# Patient Record
Sex: FEMALE
[De-identification: names, ages, dates, MRNs, and addresses within clinical notes are randomized per-mention and may not be internally consistent; named-entity substitution may affect disease eponyms.]

---

## 2024-09-16 PROBLEM — Z00.00 ENCOUNTER FOR PREVENTIVE HEALTH EXAMINATION: Status: ACTIVE | Noted: 2024-09-16

## 2024-09-17 ENCOUNTER — APPOINTMENT (OUTPATIENT)
Dept: ANTEPARTUM | Facility: CLINIC | Age: 39
End: 2024-09-17

## 2024-09-17 ENCOUNTER — APPOINTMENT (OUTPATIENT)
Dept: OBGYN | Facility: CLINIC | Age: 39
End: 2024-09-17
Payer: MEDICAID

## 2024-09-17 DIAGNOSIS — Z34.90 ENCOUNTER FOR SUPERVISION OF NORMAL PREGNANCY, UNSPECIFIED, UNSPECIFIED TRIMESTER: ICD-10-CM

## 2024-09-17 PROCEDURE — 76813 OB US NUCHAL MEAS 1 GEST: CPT

## 2024-09-17 PROCEDURE — 99203 OFFICE O/P NEW LOW 30 MIN: CPT | Mod: TH

## 2024-09-17 PROCEDURE — 76801 OB US < 14 WKS SINGLE FETUS: CPT | Mod: 59

## 2024-09-17 RX ORDER — ASCORBIC ACID, CHOLECALCIFEROL, .ALPHA.-TOCOPHEROL ACETATE, DL-, PYRIDOXINE, FOLIC ACID, CYANOCOBALAMIN, CALCIUM, FERROUS FUMARATE, MAGNESIUM, DOCONEXENT 85; 200; 10; 25; 1; 12; 140; 27; 45; 300 [IU]/1; [IU]/1; [IU]/1; [IU]/1; MG/1; UG/1; MG/1; MG/1; MG/1; MG/1
27-0.6-0.4-3 CAPSULE, GELATIN COATED ORAL
Qty: 90 | Refills: 3 | Status: ACTIVE | COMMUNITY
Start: 2024-09-17 | End: 1900-01-01

## 2024-09-23 LAB
ADDITIONAL US: NORMAL
APPEARANCE: ABNORMAL
BACTERIA UR CULT: NORMAL
BACTERIA: ABNORMAL /HPF
BILIRUBIN URINE: NEGATIVE
BLOOD URINE: NEGATIVE
CAST: 1 /LPF
COLOR: YELLOW
CRL SCAN TWIN B: NORMAL
CRL SCAN: NORMAL
CROWN RUMP LENGTH TWIN B: NORMAL
CROWN RUMP LENGTH: 55.5 MM
DIA MOM: 0.75
DIA VALUE: 155.6 PG/ML
DOWN SYNDROME AGE RISK: NORMAL
DOWN SYNDROME INTERPRETATION: NORMAL
DOWN SYNDROME SCREENING RISK: NORMAL
EPITHELIAL CELLS: 2 /HPF
FIRST TRIMESTER SCREEN COMMENTS: NORMAL
FIRST TRIMESTER SCREEN NOTE: NORMAL
FIRST TRIMESTER SCREEN RESULTS: NORMAL
FIRST TRIMESTER SCREEN TEST RESULTS: NORMAL
GEST. AGE ON COLLECTION DATE: 12 WEEKS
GLUCOSE QUALITATIVE U: NEGATIVE MG/DL
HCG MOM: 1.11
HCG VALUE: 90.8 IU/ML
KETONES URINE: ABNORMAL MG/DL
LEUKOCYTE ESTERASE URINE: NEGATIVE
MATERNAL AGE AT EDD: 38.1 YR
MICROSCOPIC-UA: NORMAL
NITRITE URINE: NEGATIVE
NT MOM TWIN B: NORMAL
NT TWIN B: NORMAL
NUCHAL TRANSLUCENCY (NT): 1.2 MM
NUCHAL TRANSLUCENCY MOM: 0.96
NUMBER OF FETUSES: 1
PAPP-A MOM: 0.61
PAPP-A VALUE: 320.5 NG/ML
PH URINE: 6
PROTEIN URINE: NORMAL MG/DL
RACE: NORMAL
RED BLOOD CELLS URINE: 2 /HPF
SONOGRAPHER ID#: NORMAL
SPECIFIC GRAVITY URINE: >1.03
TRISOMY 18 AGE RISK: NORMAL
TRISOMY 18 INTERPRETATION: NORMAL
TRISOMY 18 SCREENING RISK: NORMAL
UROBILINOGEN URINE: 0.2 MG/DL
WEIGHT AFP: 217 LBS
WHITE BLOOD CELLS URINE: 1 /HPF

## 2024-09-24 LAB
CHROMOSOME13 INTERPRETATION: NORMAL
CHROMOSOME13 TEST RESULT: NORMAL
CHROMOSOME18 INTERPRETATION: NORMAL
CHROMOSOME18 TEST RESULT: NORMAL
CHROMOSOME21 INTERPRETATION: NORMAL
CHROMOSOME21 TEST RESULT: NORMAL
FETAL FRACTION: NORMAL
PERFORMANCE AND LIMITATIONS: NORMAL
SEX CHROMOSOME INTERPRETATION: NORMAL
SEX CHROMOSOME TEST RESULT: NORMAL
VERIFI PRENATAL TEST: NOT DETECTED

## 2024-09-30 DIAGNOSIS — O09.90 SUPERVISION OF HIGH RISK PREGNANCY, UNSPECIFIED, UNSPECIFIED TRIMESTER: ICD-10-CM

## 2024-09-30 DIAGNOSIS — O09.299 SUPERVISION OF PREGNANCY WITH OTHER POOR REPRODUCTIVE OR OBSTETRIC HISTORY, UNSPECIFIED TRIMESTER: ICD-10-CM

## 2024-09-30 DIAGNOSIS — Z3A.14 14 WEEKS GESTATION OF PREGNANCY: ICD-10-CM

## 2024-10-01 ENCOUNTER — APPOINTMENT (OUTPATIENT)
Dept: ANTEPARTUM | Facility: CLINIC | Age: 39
End: 2024-10-01

## 2024-10-01 ENCOUNTER — APPOINTMENT (OUTPATIENT)
Dept: OBGYN | Facility: CLINIC | Age: 39
End: 2024-10-01

## 2024-12-11 ENCOUNTER — APPOINTMENT (OUTPATIENT)
Dept: OBGYN | Facility: CLINIC | Age: 39
End: 2024-12-11

## 2024-12-11 ENCOUNTER — APPOINTMENT (OUTPATIENT)
Dept: ANTEPARTUM | Facility: CLINIC | Age: 39
End: 2024-12-11

## 2025-01-09 ENCOUNTER — APPOINTMENT (OUTPATIENT)
Dept: ANTEPARTUM | Facility: CLINIC | Age: 40
End: 2025-01-09
Payer: MEDICAID

## 2025-01-09 ENCOUNTER — ASOB RESULT (OUTPATIENT)
Age: 40
End: 2025-01-09

## 2025-01-09 ENCOUNTER — APPOINTMENT (OUTPATIENT)
Dept: OBGYN | Facility: CLINIC | Age: 40
End: 2025-01-09
Payer: SELF-PAY

## 2025-01-09 VITALS — SYSTOLIC BLOOD PRESSURE: 117 MMHG | DIASTOLIC BLOOD PRESSURE: 78 MMHG | WEIGHT: 231 LBS

## 2025-01-09 DIAGNOSIS — Z23 ENCOUNTER FOR IMMUNIZATION: ICD-10-CM

## 2025-01-09 DIAGNOSIS — Z34.90 ENCOUNTER FOR SUPERVISION OF NORMAL PREGNANCY, UNSPECIFIED, UNSPECIFIED TRIMESTER: ICD-10-CM

## 2025-01-09 PROCEDURE — 99213 OFFICE O/P EST LOW 20 MIN: CPT | Mod: 25

## 2025-01-09 PROCEDURE — 90715 TDAP VACCINE 7 YRS/> IM: CPT

## 2025-01-09 PROCEDURE — 90471 IMMUNIZATION ADMIN: CPT

## 2025-01-09 PROCEDURE — 36415 COLL VENOUS BLD VENIPUNCTURE: CPT

## 2025-01-09 PROCEDURE — 76811 OB US DETAILED SNGL FETUS: CPT

## 2025-01-09 PROCEDURE — 76819 FETAL BIOPHYS PROFIL W/O NST: CPT

## 2025-01-12 LAB — GLUCOSE 1H P 50 G GLC PO SERPL-MCNC: 112 MG/DL

## 2025-01-13 DIAGNOSIS — O99.019 ANEMIA COMPLICATING PREGNANCY, UNSPECIFIED TRIMESTER: ICD-10-CM

## 2025-01-13 LAB
BASOPHILS # BLD AUTO: 0.03 K/UL
BASOPHILS NFR BLD AUTO: 0.4 %
EOSINOPHIL # BLD AUTO: 0.14 K/UL
EOSINOPHIL NFR BLD AUTO: 1.9 %
HCT VFR BLD CALC: 30.5 %
HGB BLD-MCNC: 10.4 G/DL
IMM GRANULOCYTES NFR BLD AUTO: 0.4 %
LYMPHOCYTES # BLD AUTO: 2.49 K/UL
LYMPHOCYTES NFR BLD AUTO: 34.6 %
MAN DIFF?: NORMAL
MCHC RBC-ENTMCNC: 30.1 PG
MCHC RBC-ENTMCNC: 34.1 G/DL
MCV RBC AUTO: 88.2 FL
MONOCYTES # BLD AUTO: 0.52 K/UL
MONOCYTES NFR BLD AUTO: 7.2 %
NEUTROPHILS # BLD AUTO: 3.98 K/UL
NEUTROPHILS NFR BLD AUTO: 55.5 %
PLATELET # BLD AUTO: 213 K/UL
RBC # BLD: 3.46 M/UL
RBC # FLD: 14 %
WBC # FLD AUTO: 7.19 K/UL

## 2025-01-13 RX ORDER — FERRIC MALTOL 30 MG/1
30 CAPSULE ORAL
Qty: 3 | Refills: 3 | Status: ACTIVE | COMMUNITY
Start: 2025-01-13 | End: 1900-01-01

## 2025-01-23 ENCOUNTER — APPOINTMENT (OUTPATIENT)
Dept: OBGYN | Facility: CLINIC | Age: 40
End: 2025-01-23
Payer: MEDICAID

## 2025-01-23 ENCOUNTER — NON-APPOINTMENT (OUTPATIENT)
Age: 40
End: 2025-01-23

## 2025-01-23 VITALS
SYSTOLIC BLOOD PRESSURE: 118 MMHG | DIASTOLIC BLOOD PRESSURE: 78 MMHG | BODY MASS INDEX: 36.26 KG/M2 | WEIGHT: 231 LBS | HEIGHT: 67 IN

## 2025-01-23 PROCEDURE — ZZZZZ: CPT

## 2025-02-06 ENCOUNTER — APPOINTMENT (OUTPATIENT)
Dept: OBGYN | Facility: CLINIC | Age: 40
End: 2025-02-06

## 2025-02-06 ENCOUNTER — APPOINTMENT (OUTPATIENT)
Dept: OBGYN | Facility: CLINIC | Age: 40
End: 2025-02-06
Payer: MEDICAID

## 2025-02-06 VITALS
SYSTOLIC BLOOD PRESSURE: 115 MMHG | HEIGHT: 67 IN | BODY MASS INDEX: 37.51 KG/M2 | WEIGHT: 239 LBS | DIASTOLIC BLOOD PRESSURE: 77 MMHG

## 2025-02-06 PROCEDURE — 99212 OFFICE O/P EST SF 10 MIN: CPT | Mod: TH

## 2025-02-20 ENCOUNTER — APPOINTMENT (OUTPATIENT)
Dept: ANTEPARTUM | Facility: CLINIC | Age: 40
End: 2025-02-20

## 2025-02-20 ENCOUNTER — APPOINTMENT (OUTPATIENT)
Dept: OBGYN | Facility: CLINIC | Age: 40
End: 2025-02-20

## 2025-02-27 ENCOUNTER — APPOINTMENT (OUTPATIENT)
Dept: OBGYN | Facility: CLINIC | Age: 40
End: 2025-02-27

## 2025-03-20 ENCOUNTER — APPOINTMENT (OUTPATIENT)
Dept: ANTEPARTUM | Facility: CLINIC | Age: 40
End: 2025-03-20

## 2025-03-20 ENCOUNTER — APPOINTMENT (OUTPATIENT)
Dept: OBGYN | Facility: CLINIC | Age: 40
End: 2025-03-20

## 2025-03-27 ENCOUNTER — ASOB RESULT (OUTPATIENT)
Age: 40
End: 2025-03-27

## 2025-03-27 ENCOUNTER — APPOINTMENT (OUTPATIENT)
Dept: OBGYN | Facility: CLINIC | Age: 40
End: 2025-03-27
Payer: MEDICAID

## 2025-03-27 ENCOUNTER — APPOINTMENT (OUTPATIENT)
Dept: ANTEPARTUM | Facility: CLINIC | Age: 40
End: 2025-03-27

## 2025-03-27 VITALS — SYSTOLIC BLOOD PRESSURE: 130 MMHG | WEIGHT: 232 LBS | DIASTOLIC BLOOD PRESSURE: 84 MMHG | BODY MASS INDEX: 36.34 KG/M2

## 2025-03-27 PROCEDURE — 76816 OB US FOLLOW-UP PER FETUS: CPT

## 2025-03-27 PROCEDURE — 99212 OFFICE O/P EST SF 10 MIN: CPT | Mod: TH

## 2025-03-27 PROCEDURE — 76819 FETAL BIOPHYS PROFIL W/O NST: CPT | Mod: 59

## 2025-03-28 ENCOUNTER — TRANSCRIPTION ENCOUNTER (OUTPATIENT)
Age: 40
End: 2025-03-28

## 2025-03-28 ENCOUNTER — APPOINTMENT (OUTPATIENT)
Dept: ANTEPARTUM | Facility: CLINIC | Age: 40
End: 2025-03-28

## 2025-03-28 ENCOUNTER — INPATIENT (INPATIENT)
Facility: HOSPITAL | Age: 40
LOS: 2 days | Discharge: HOME | End: 2025-03-31
Attending: OBSTETRICS & GYNECOLOGY | Admitting: OBSTETRICS & GYNECOLOGY
Payer: MEDICAID

## 2025-03-28 VITALS — DIASTOLIC BLOOD PRESSURE: 73 MMHG | HEART RATE: 77 BPM | SYSTOLIC BLOOD PRESSURE: 140 MMHG

## 2025-03-28 DIAGNOSIS — O61.9 FAILED INDUCTION OF LABOR, UNSPECIFIED: ICD-10-CM

## 2025-03-28 DIAGNOSIS — O26.899 OTHER SPECIFIED PREGNANCY RELATED CONDITIONS, UNSPECIFIED TRIMESTER: ICD-10-CM

## 2025-03-28 LAB
ALBUMIN SERPL ELPH-MCNC: 3.2 G/DL — LOW (ref 3.3–5)
ALP SERPL-CCNC: 90 U/L — SIGNIFICANT CHANGE UP (ref 40–120)
ALT FLD-CCNC: 6 U/L — SIGNIFICANT CHANGE UP (ref 4–33)
ANION GAP SERPL CALC-SCNC: 13 MMOL/L — SIGNIFICANT CHANGE UP (ref 7–14)
APPEARANCE UR: ABNORMAL
AST SERPL-CCNC: 14 U/L — SIGNIFICANT CHANGE UP (ref 4–32)
BACTERIA # UR AUTO: ABNORMAL /HPF
BASOPHILS # BLD AUTO: 0.02 K/UL — SIGNIFICANT CHANGE UP (ref 0–0.2)
BASOPHILS # BLD AUTO: 0.02 K/UL — SIGNIFICANT CHANGE UP (ref 0–0.2)
BASOPHILS NFR BLD AUTO: 0.2 % — SIGNIFICANT CHANGE UP (ref 0–2)
BASOPHILS NFR BLD AUTO: 0.3 % — SIGNIFICANT CHANGE UP (ref 0–2)
BILIRUB SERPL-MCNC: 0.4 MG/DL — SIGNIFICANT CHANGE UP (ref 0.2–1.2)
BILIRUB UR-MCNC: NEGATIVE — SIGNIFICANT CHANGE UP
BLD GP AB SCN SERPL QL: NEGATIVE — SIGNIFICANT CHANGE UP
BUN SERPL-MCNC: 6 MG/DL — LOW (ref 7–23)
CALCIUM SERPL-MCNC: 8.5 MG/DL — SIGNIFICANT CHANGE UP (ref 8.4–10.5)
CAST: 0 /LPF — SIGNIFICANT CHANGE UP (ref 0–4)
CHLORIDE SERPL-SCNC: 105 MMOL/L — SIGNIFICANT CHANGE UP (ref 98–107)
CO2 SERPL-SCNC: 20 MMOL/L — LOW (ref 22–31)
COLOR SPEC: YELLOW — SIGNIFICANT CHANGE UP
CREAT ?TM UR-MCNC: 174 MG/DL — SIGNIFICANT CHANGE UP
CREAT SERPL-MCNC: 0.54 MG/DL — SIGNIFICANT CHANGE UP (ref 0.5–1.3)
DIFF PNL FLD: NEGATIVE — SIGNIFICANT CHANGE UP
EGFR: 121 ML/MIN/1.73M2 — SIGNIFICANT CHANGE UP
EGFR: 121 ML/MIN/1.73M2 — SIGNIFICANT CHANGE UP
EOSINOPHIL # BLD AUTO: 0.07 K/UL — SIGNIFICANT CHANGE UP (ref 0–0.5)
EOSINOPHIL # BLD AUTO: 0.1 K/UL — SIGNIFICANT CHANGE UP (ref 0–0.5)
EOSINOPHIL NFR BLD AUTO: 0.9 % — SIGNIFICANT CHANGE UP (ref 0–6)
EOSINOPHIL NFR BLD AUTO: 1.3 % — SIGNIFICANT CHANGE UP (ref 0–6)
GLUCOSE SERPL-MCNC: 82 MG/DL — SIGNIFICANT CHANGE UP (ref 70–99)
GLUCOSE UR QL: NEGATIVE MG/DL — SIGNIFICANT CHANGE UP
HCT VFR BLD CALC: 30.6 % — LOW (ref 34.5–45)
HCT VFR BLD CALC: 30.9 % — LOW (ref 34.5–45)
HGB BLD-MCNC: 10.4 G/DL — LOW (ref 11.5–15.5)
HGB BLD-MCNC: 10.5 G/DL — LOW (ref 11.5–15.5)
IANC: 4.7 K/UL — SIGNIFICANT CHANGE UP (ref 1.8–7.4)
IANC: 4.93 K/UL — SIGNIFICANT CHANGE UP (ref 1.8–7.4)
IMM GRANULOCYTES NFR BLD AUTO: 0.5 % — SIGNIFICANT CHANGE UP (ref 0–0.9)
IMM GRANULOCYTES NFR BLD AUTO: 1.2 % — HIGH (ref 0–0.9)
KETONES UR-MCNC: NEGATIVE MG/DL — SIGNIFICANT CHANGE UP
LDH SERPL L TO P-CCNC: 137 U/L — SIGNIFICANT CHANGE UP (ref 135–225)
LEUKOCYTE ESTERASE UR-ACNC: ABNORMAL
LYMPHOCYTES # BLD AUTO: 2.31 K/UL — SIGNIFICANT CHANGE UP (ref 1–3.3)
LYMPHOCYTES # BLD AUTO: 2.39 K/UL — SIGNIFICANT CHANGE UP (ref 1–3.3)
LYMPHOCYTES # BLD AUTO: 29.3 % — SIGNIFICANT CHANGE UP (ref 13–44)
LYMPHOCYTES # BLD AUTO: 29.7 % — SIGNIFICANT CHANGE UP (ref 13–44)
MCHC RBC-ENTMCNC: 29.1 PG — SIGNIFICANT CHANGE UP (ref 27–34)
MCHC RBC-ENTMCNC: 29.8 PG — SIGNIFICANT CHANGE UP (ref 27–34)
MCHC RBC-ENTMCNC: 33.7 G/DL — SIGNIFICANT CHANGE UP (ref 32–36)
MCHC RBC-ENTMCNC: 34.3 G/DL — SIGNIFICANT CHANGE UP (ref 32–36)
MCV RBC AUTO: 86.6 FL — SIGNIFICANT CHANGE UP (ref 80–100)
MCV RBC AUTO: 86.9 FL — SIGNIFICANT CHANGE UP (ref 80–100)
MONOCYTES # BLD AUTO: 0.6 K/UL — SIGNIFICANT CHANGE UP (ref 0–0.9)
MONOCYTES # BLD AUTO: 0.65 K/UL — SIGNIFICANT CHANGE UP (ref 0–0.9)
MONOCYTES NFR BLD AUTO: 7.7 % — SIGNIFICANT CHANGE UP (ref 2–14)
MONOCYTES NFR BLD AUTO: 8 % — SIGNIFICANT CHANGE UP (ref 2–14)
NEUTROPHILS # BLD AUTO: 4.7 K/UL — SIGNIFICANT CHANGE UP (ref 1.8–7.4)
NEUTROPHILS # BLD AUTO: 4.93 K/UL — SIGNIFICANT CHANGE UP (ref 1.8–7.4)
NEUTROPHILS NFR BLD AUTO: 60.4 % — SIGNIFICANT CHANGE UP (ref 43–77)
NEUTROPHILS NFR BLD AUTO: 60.5 % — SIGNIFICANT CHANGE UP (ref 43–77)
NITRITE UR-MCNC: NEGATIVE — SIGNIFICANT CHANGE UP
NRBC # BLD AUTO: 0 K/UL — SIGNIFICANT CHANGE UP (ref 0–0)
NRBC # BLD AUTO: 0 K/UL — SIGNIFICANT CHANGE UP (ref 0–0)
NRBC # FLD: 0 K/UL — SIGNIFICANT CHANGE UP (ref 0–0)
NRBC # FLD: 0 K/UL — SIGNIFICANT CHANGE UP (ref 0–0)
NRBC BLD AUTO-RTO: 0 /100 WBCS — SIGNIFICANT CHANGE UP (ref 0–0)
NRBC BLD AUTO-RTO: 0 /100 WBCS — SIGNIFICANT CHANGE UP (ref 0–0)
PH UR: 7 — SIGNIFICANT CHANGE UP (ref 5–8)
PLATELET # BLD AUTO: 207 K/UL — SIGNIFICANT CHANGE UP (ref 150–400)
PLATELET # BLD AUTO: 213 K/UL — SIGNIFICANT CHANGE UP (ref 150–400)
POTASSIUM SERPL-MCNC: 3.5 MMOL/L — SIGNIFICANT CHANGE UP (ref 3.5–5.3)
POTASSIUM SERPL-SCNC: 3.5 MMOL/L — SIGNIFICANT CHANGE UP (ref 3.5–5.3)
PROT ?TM UR-MCNC: 18 MG/DL — SIGNIFICANT CHANGE UP
PROT SERPL-MCNC: 5.9 G/DL — LOW (ref 6–8.3)
PROT UR-MCNC: SIGNIFICANT CHANGE UP MG/DL
PROT/CREAT UR-RTO: 0.1 RATIO — SIGNIFICANT CHANGE UP (ref 0–0.2)
RBC # BLD: 3.52 M/UL — LOW (ref 3.8–5.2)
RBC # BLD: 3.57 M/UL — LOW (ref 3.8–5.2)
RBC # FLD: 13.9 % — SIGNIFICANT CHANGE UP (ref 10.3–14.5)
RBC # FLD: 13.9 % — SIGNIFICANT CHANGE UP (ref 10.3–14.5)
RBC CASTS # UR COMP ASSIST: 2 /HPF — SIGNIFICANT CHANGE UP (ref 0–4)
REVIEW: SIGNIFICANT CHANGE UP
RH IG SCN BLD-IMP: POSITIVE — SIGNIFICANT CHANGE UP
RH IG SCN BLD-IMP: POSITIVE — SIGNIFICANT CHANGE UP
SODIUM SERPL-SCNC: 138 MMOL/L — SIGNIFICANT CHANGE UP (ref 135–145)
SP GR SPEC: 1.02 — SIGNIFICANT CHANGE UP (ref 1–1.03)
SQUAMOUS # UR AUTO: 33 /HPF — HIGH (ref 0–5)
T PALLIDUM AB TITR SER: NEGATIVE — SIGNIFICANT CHANGE UP
URATE SERPL-MCNC: 3 MG/DL — SIGNIFICANT CHANGE UP (ref 2.5–7)
UROBILINOGEN FLD QL: 1 MG/DL — SIGNIFICANT CHANGE UP (ref 0.2–1)
WBC # BLD: 7.77 K/UL — SIGNIFICANT CHANGE UP (ref 3.8–10.5)
WBC # BLD: 8.16 K/UL — SIGNIFICANT CHANGE UP (ref 3.8–10.5)
WBC # FLD AUTO: 7.77 K/UL — SIGNIFICANT CHANGE UP (ref 3.8–10.5)
WBC # FLD AUTO: 8.16 K/UL — SIGNIFICANT CHANGE UP (ref 3.8–10.5)
WBC UR QL: 26 /HPF — HIGH (ref 0–5)

## 2025-03-28 RX ORDER — OXYTOCIN-SODIUM CHLORIDE 0.9% IV SOLN 30 UNIT/500ML 30-0.9/5 UT/ML-%
167 SOLUTION INTRAVENOUS
Qty: 30 | Refills: 0 | Status: DISCONTINUED | OUTPATIENT
Start: 2025-03-28 | End: 2025-03-30

## 2025-03-28 RX ORDER — CITRIC ACID/SODIUM CITRATE 300-500 MG
15 SOLUTION, ORAL ORAL EVERY 6 HOURS
Refills: 0 | Status: DISCONTINUED | OUTPATIENT
Start: 2025-03-28 | End: 2025-03-28

## 2025-03-28 RX ORDER — SODIUM CHLORIDE 9 G/1000ML
1000 INJECTION, SOLUTION INTRAVENOUS
Refills: 0 | Status: DISCONTINUED | OUTPATIENT
Start: 2025-03-28 | End: 2025-03-29

## 2025-03-28 RX ORDER — OXYTOCIN-SODIUM CHLORIDE 0.9% IV SOLN 30 UNIT/500ML 30-0.9/5 UT/ML-%
167 SOLUTION INTRAVENOUS
Qty: 30 | Refills: 0 | Status: DISCONTINUED | OUTPATIENT
Start: 2025-03-28 | End: 2025-03-28

## 2025-03-28 RX ORDER — SODIUM CHLORIDE 9 G/1000ML
1000 INJECTION, SOLUTION INTRAVENOUS
Refills: 0 | Status: DISCONTINUED | OUTPATIENT
Start: 2025-03-28 | End: 2025-03-28

## 2025-03-28 RX ORDER — CITRIC ACID/SODIUM CITRATE 300-500 MG
15 SOLUTION, ORAL ORAL EVERY 6 HOURS
Refills: 0 | Status: DISCONTINUED | OUTPATIENT
Start: 2025-03-28 | End: 2025-03-29

## 2025-03-28 RX ADMIN — Medication 1 APPLICATION(S): at 10:43

## 2025-03-28 RX ADMIN — SODIUM CHLORIDE 125 MILLILITER(S): 9 INJECTION, SOLUTION INTRAVENOUS at 07:50

## 2025-03-28 NOTE — OB PROVIDER H&P - NS_DILATION_OBGYN_ALL_OB_NU
Patient's family reports that her wound vac was removed yesterday. Gauze bandage noted on midsternal chest. Wound dressing is clean, dry and intact. Patient's family requesting the bandage be changed today prior to leaving.    0

## 2025-03-28 NOTE — OB PROVIDER H&P - NSHPPHYSICALEXAM_GEN_ALL_CORE
Physical Exam  CV: clinically well perfused  Pulm: breathing comfortably on RA  Abd: gravid, nontender  Extr: moving all extremities with ease  – VE: 0/0/-3  – FHT: baseline 1, mod variability, +accels, -decels  – [place]: qmin  – Sono: vertex

## 2025-03-28 NOTE — OB RN PATIENT PROFILE - BILL OF RIGHTS/ADMISSION INFORMATION PROVIDED TO:
Giulia left message on office machine that she needs order sent to [facility] to allow them to give advil for lower back pain.   Patient

## 2025-03-28 NOTE — OB PROVIDER H&P - ASSESSMENT
Assessment  40y  at 39w4d presents for IOL due to IUGR (EFW 4%ile, AC <1%ile, UAD wnl).    -Admit to L&D  -Routine labs    -EFM & toco  -CLD & IVF        -GBS unk but no risk factors, no antibiotics indicated at this time  -Epidural PRN  -For induction with buccal cytotec and cervical balloon     Plan per attending physician, Dr. Terra Baron   PGY-1

## 2025-03-28 NOTE — OB RN PATIENT PROFILE - NS_PRENATALLOC_OBGYN_ALL_OB
Chief Complaint   Patient presents with   • Derm Problem     right thigh, rash, raised and painful with numbness        Medications: medications verified, no change  Refills needed today?  NO  Denies known Latex allergy or symptoms of Latex sensitivity.  Patient would like communication of their results via:      Cell Phone:   Telephone Information:   Mobile [phone]   Mobile [phone]     Okay to leave a message containing results? Yes  Tobacco history: verified    Health Maintenance Summary     Topic Due On Due Status Completed On    MAMMOGRAM - BREAST CANCER SCREENING Nov 16, 2019 Not Due Nov 16, 2017    Pap Smear - Cervical Cancer Screening  Mar 20, 2019 Not Due Mar 20, 2014    Colorectal Cancer Screening - Colonoscopy Nov 11, 2018 Not Due Nov 11, 2013    Diabetes Eye Exam Aug 1, 2016 Overdue Aug 1, 2015    Glycohemoglobin A1C  (Diabetes Sugar)  Aug 12, 2018 Not Due Feb 12, 2018    Microalbumin  (Diabetes Urine Test)  Jan 30, 2018 Overdue Jan 30, 2017    GFR  (Kidney Function Test)  May 30, 2019 Not Due May 30, 2018    Immunization - TDAP Pregnancy  Hidden     Diabetes Foot Exam  Aug 11, 2018 Not Due Aug 11, 2017    IMMUNIZATION - DTaP/Tdap/Td May 10, 2022 Not Due May 10, 2012    Immunization-Influenza Sep 1, 2018 Not Due     Depression Screening Oct 19, 1970 Overdue     Hepatitis C Screening  Completed Aug 11, 2017    Lung Cancer Screening Oct 19, 2013 Overdue           Patient is due for topics as listed above, she wishes to defer to PCP .              [facility] status addressed. Patient Active.           MD Office

## 2025-03-28 NOTE — OB PROVIDER H&P - HISTORY OF PRESENT ILLNESS
Admission H&P    Subjective  HPI: 40y  at 39w4d presents for IOL due to IUGR (EFW 4%ile, AC <1%ile, UAD wnl). +FM. -LOF. -CTXs. -VB. Pt denies any other concerns.    – PNC: Denies prenatal issues. GBS unknown (was performed 3/27).  EFW 2738g by sono on 3/27.   – OBHx:    FT VD 6lb 2oz   D&E at 20 weeks due to PPROM    TOP (with D&C)  – GynHx: denies fibroids, abnormal pap smears, STIs  – PMH: denies  – PSH: D&C/D&E  – Meds: PNV, folic acid    – Allergies: NKDA      Physical Exam  CV: clinically well perfused  Pulm: breathing comfortably on RA  Abd: gravid, nontender  Extr: moving all extremities with ease  – VE: 0/0/-3  – FHT: baseline 1, mod variability, +accels, -decels  – [place]: qmin  – Sono: vertex    Assessment  40y G_P_ at __w__d presents for _.     -Admit to L&D  -Routine labs    -EFM & toco  -CLD & IVF        -GBS neg     -AMP for GBS pos   -Epidural PRN  -For induction    Plan per attending physician, Dr. Terra Baron   PGY-1 Admission H&P    Subjective  HPI: 40y  at 39w4d presents for IOL due to IUGR (EFW 4%ile, AC <1%ile, UAD wnl). +FM. -LOF. -CTXs. -VB. Pt denies any other concerns.    – PNC:  GBS unknown (was performed 3/27).  EFW 2738g by sono on 3/27.   – OBHx:    FT VD 6lb 2oz   D&E at 20 weeks due to PPROM    TOP (with D&C)  – GynHx: denies fibroids, abnormal pap smears, STIs  – PMH: denies  – PSH: D&C/D&E  – Meds: PNV, folic acid    – Allergies: NKDA

## 2025-03-28 NOTE — OB PROVIDER H&P - NSLOWPPHRISK_OBGYN_A_OB
No previous uterine incision/[place] Pregnancy/Less than or equal to 4 previous vaginal births/No known bleeding disorder/No history of postpartum hemorrhage

## 2025-03-28 NOTE — OB RN PATIENT PROFILE - BMI (KG/M2)
You can access the FollowMyHealth Patient Portal offered by [facility] by registering at the following website: [email or web address]. By joining [employer]’s FollowMyHealth portal, you will also be able to view your health information using other applications (apps) compatible with our system.
37.3

## 2025-03-29 ENCOUNTER — TRANSCRIPTION ENCOUNTER (OUTPATIENT)
Age: 40
End: 2025-03-29

## 2025-03-29 PROCEDURE — 59409 OBSTETRICAL CARE: CPT | Mod: U9

## 2025-03-29 RX ORDER — IBUPROFEN 200 MG
600 TABLET ORAL EVERY 6 HOURS
Refills: 0 | Status: DISCONTINUED | OUTPATIENT
Start: 2025-03-29 | End: 2025-03-31

## 2025-03-29 RX ORDER — HYDROCORTISONE 10 MG/G
1 CREAM TOPICAL EVERY 6 HOURS
Refills: 0 | Status: DISCONTINUED | OUTPATIENT
Start: 2025-03-29 | End: 2025-03-31

## 2025-03-29 RX ORDER — DIPHENHYDRAMINE HCL 12.5MG/5ML
25 ELIXIR ORAL EVERY 6 HOURS
Refills: 0 | Status: DISCONTINUED | OUTPATIENT
Start: 2025-03-29 | End: 2025-03-31

## 2025-03-29 RX ORDER — BENZOCAINE 220 MG/G
1 SPRAY, METERED PERIODONTAL EVERY 6 HOURS
Refills: 0 | Status: DISCONTINUED | OUTPATIENT
Start: 2025-03-29 | End: 2025-03-31

## 2025-03-29 RX ORDER — WITCH HAZEL LEAF
1 FLUID EXTRACT MISCELLANEOUS EVERY 4 HOURS
Refills: 0 | Status: DISCONTINUED | OUTPATIENT
Start: 2025-03-29 | End: 2025-03-31

## 2025-03-29 RX ORDER — PRENATAL 136/IRON/FOLIC ACID 27 MG-1 MG
1 TABLET ORAL DAILY
Refills: 0 | Status: DISCONTINUED | OUTPATIENT
Start: 2025-03-29 | End: 2025-03-31

## 2025-03-29 RX ORDER — OXYCODONE HYDROCHLORIDE 30 MG/1
5 TABLET ORAL ONCE
Refills: 0 | Status: DISCONTINUED | OUTPATIENT
Start: 2025-03-29 | End: 2025-03-31

## 2025-03-29 RX ORDER — PRAMOXINE HCL 1 %
1 GEL (GRAM) TOPICAL EVERY 4 HOURS
Refills: 0 | Status: DISCONTINUED | OUTPATIENT
Start: 2025-03-29 | End: 2025-03-31

## 2025-03-29 RX ORDER — KETOROLAC TROMETHAMINE 30 MG/ML
30 INJECTION, SOLUTION INTRAMUSCULAR; INTRAVENOUS ONCE
Refills: 0 | Status: DISCONTINUED | OUTPATIENT
Start: 2025-03-29 | End: 2025-03-30

## 2025-03-29 RX ORDER — OXYCODONE HYDROCHLORIDE 30 MG/1
5 TABLET ORAL
Refills: 0 | Status: DISCONTINUED | OUTPATIENT
Start: 2025-03-29 | End: 2025-03-31

## 2025-03-29 RX ORDER — DIBUCAINE 10 MG/G
1 OINTMENT TOPICAL EVERY 6 HOURS
Refills: 0 | Status: DISCONTINUED | OUTPATIENT
Start: 2025-03-29 | End: 2025-03-31

## 2025-03-29 RX ORDER — OXYTOCIN-SODIUM CHLORIDE 0.9% IV SOLN 30 UNIT/500ML 30-0.9/5 UT/ML-%
167 SOLUTION INTRAVENOUS
Qty: 30 | Refills: 0 | Status: DISCONTINUED | OUTPATIENT
Start: 2025-03-29 | End: 2025-03-30

## 2025-03-29 RX ORDER — MODIFIED LANOLIN 100 %
1 CREAM (GRAM) TOPICAL EVERY 6 HOURS
Refills: 0 | Status: DISCONTINUED | OUTPATIENT
Start: 2025-03-29 | End: 2025-03-31

## 2025-03-29 RX ORDER — OXYTOCIN-SODIUM CHLORIDE 0.9% IV SOLN 30 UNIT/500ML 30-0.9/5 UT/ML-%
SOLUTION INTRAVENOUS
Qty: 30 | Refills: 0 | Status: DISCONTINUED | OUTPATIENT
Start: 2025-03-29 | End: 2025-03-29

## 2025-03-29 RX ORDER — CLOSTRIDIUM TETANI TOXOID ANTIGEN (FORMALDEHYDE INACTIVATED), CORYNEBACTERIUM DIPHTHERIAE TOXOID ANTIGEN (FORMALDEHYDE INACTIVATED), BORDETELLA PERTUSSIS TOXOID ANTIGEN (GLUTARALDEHYDE INACTIVATED), BORDETELLA PERTUSSIS FILAMENTOUS HEMAGGLUTININ ANTIGEN (FORMALDEHYDE INACTIVATED), BORDETELLA PERTUSSIS PERTACTIN ANTIGEN, AND BORDETELLA PERTUSSIS FIMBRIAE 2/3 ANTIGEN 5; 2; 2.5; 5; 3; 5 [LF]/.5ML; [LF]/.5ML; UG/.5ML; UG/.5ML; UG/.5ML; UG/.5ML
0.5 INJECTION, SUSPENSION INTRAMUSCULAR ONCE
Refills: 0 | Status: DISCONTINUED | OUTPATIENT
Start: 2025-03-29 | End: 2025-03-31

## 2025-03-29 RX ORDER — IBUPROFEN 200 MG
600 TABLET ORAL EVERY 6 HOURS
Refills: 0 | Status: COMPLETED | OUTPATIENT
Start: 2025-03-29 | End: 2026-02-25

## 2025-03-29 RX ORDER — ACETAMINOPHEN 500 MG/5ML
975 LIQUID (ML) ORAL
Refills: 0 | Status: DISCONTINUED | OUTPATIENT
Start: 2025-03-29 | End: 2025-03-31

## 2025-03-29 RX ORDER — SIMETHICONE 80 MG
80 TABLET,CHEWABLE ORAL EVERY 4 HOURS
Refills: 0 | Status: DISCONTINUED | OUTPATIENT
Start: 2025-03-29 | End: 2025-03-31

## 2025-03-29 RX ORDER — MAGNESIUM HYDROXIDE 400 MG/5ML
30 SUSPENSION ORAL
Refills: 0 | Status: DISCONTINUED | OUTPATIENT
Start: 2025-03-29 | End: 2025-03-31

## 2025-03-29 RX ADMIN — Medication 1 TABLET(S): at 11:32

## 2025-03-29 RX ADMIN — Medication 600 MILLIGRAM(S): at 11:32

## 2025-03-29 RX ADMIN — DIBUCAINE 1 APPLICATION(S): 10 OINTMENT TOPICAL at 21:44

## 2025-03-29 RX ADMIN — Medication 3 MILLILITER(S): at 06:52

## 2025-03-29 RX ADMIN — OXYTOCIN-SODIUM CHLORIDE 0.9% IV SOLN 30 UNIT/500ML 2 MILLIUNIT(S)/MIN: 30-0.9/5 SOLUTION at 01:16

## 2025-03-29 RX ADMIN — Medication 975 MILLIGRAM(S): at 09:56

## 2025-03-29 RX ADMIN — Medication 975 MILLIGRAM(S): at 15:55

## 2025-03-29 RX ADMIN — Medication 3 MILLILITER(S): at 14:24

## 2025-03-29 RX ADMIN — BENZOCAINE 1 SPRAY(S): 220 SPRAY, METERED PERIODONTAL at 21:44

## 2025-03-29 RX ADMIN — Medication 600 MILLIGRAM(S): at 12:30

## 2025-03-29 RX ADMIN — Medication 1 APPLICATION(S): at 21:44

## 2025-03-29 RX ADMIN — Medication 975 MILLIGRAM(S): at 22:25

## 2025-03-29 RX ADMIN — HYDROCORTISONE 1 APPLICATION(S): 10 CREAM TOPICAL at 21:44

## 2025-03-29 RX ADMIN — Medication 600 MILLIGRAM(S): at 19:30

## 2025-03-29 RX ADMIN — Medication 975 MILLIGRAM(S): at 21:43

## 2025-03-29 RX ADMIN — Medication 3 MILLILITER(S): at 22:25

## 2025-03-29 RX ADMIN — Medication 600 MILLIGRAM(S): at 18:52

## 2025-03-29 RX ADMIN — Medication 975 MILLIGRAM(S): at 16:30

## 2025-03-29 RX ADMIN — Medication 975 MILLIGRAM(S): at 10:30

## 2025-03-29 NOTE — DISCHARGE NOTE OB - MEDICATION SUMMARY - MEDICATIONS TO TAKE
I will START or STAY ON the medications listed below when I get home from the hospital:    Electronic Blood Pressure Monitor  -- Please take your Blood Pressure as ordered, 3 timer per day.  Call your OB office with  readings over 140/90 or if you have a severe headache, blurry vision or severe heartburn. Go to the ER or Labor and Delivery with readings of 160/100 or higher.. Keep a log of all your Blood Pressure readings and bring it with you to your next doctor's appointment.  -- Indication: For Gestational  Hypertension    ibuprofen 600 mg oral tablet  -- 1 tab(s) by mouth every 6 hours as needed for  moderate pain  -- Indication: For Pain    acetaminophen 325 mg oral tablet  -- 3 tab(s) by mouth every 6 hours as needed for  mild pain  -- Indication: For Pain

## 2025-03-29 NOTE — OB NEONATOLOGY/PEDIATRICIAN DELIVERY SUMMARY - NSPEDSNEONOTESA_OBGYN_ALL_OB_FT
Peds called to LDR for cat 2 tracing. 39+4 wk SGA female born via  to a 39 y/o  mother. Maternal hx of gHTN and IUGR. Maternal labs include Blood Type B+, HIV - , RPR NR , Rubella I , Hep B - , GBS unknown. AROM with meconium fluids (ROM hours: 2hrs). Baby emerged vigorous, crying, was warmed, dried, suctioned, and stimulated with APGARS of 8/9. Nuchalx2. Mom plans to initiate breastfeeding, declines Hep B vaccine. Highest maternal temp: 37 C. EOS 0.08.    : [birth year]  TOB: 03:22  BW: 2750    Physical Exam (Post-Delivery)  Gen: NAD; well-appearing  HEENT: NC/AT; anterior fontanelle open and flat; ears and nose clinically patent, normally set; no tags, no cleft palate appreciated  Skin: pink, warm, well-perfused, no rash  Resp: non-labored breathing  Abd: soft, NT/ND; no masses appreciated, umbilical cord with 3 vessels  Extremities: moving all extremities, no crepitus; hips negative O/B  MSK: no clavicular fracture appreciated  : Eladio I; no abnormalities; anus patent  Back: no sacral dimple  Neuro: +arlene, +babinski, grasp, good tone throughout

## 2025-03-29 NOTE — DISCHARGE NOTE OB - ADDITIONAL INSTRUCTIONS
Follow up in OB office in 1 week for blood pressure check  Follow up in 6 weeks for postpartum checkup.

## 2025-03-29 NOTE — DISCHARGE NOTE OB - CARE PROVIDER_API CALL
Jose Ellison  Maternal/Fetal Medicine  [address], [address]  [place] [address]  Phone: [phone]  Fax: [phone]  Follow Up Time: 1 week

## 2025-03-29 NOTE — DISCHARGE NOTE OB - PLAN OF CARE
routine care Follow-up in your OB office within 1 week for a blood pressure check.    blood pressure monitor from [facility] (1st floor [facility], back of [employer] [address]).  Please take your blood pressure 3x/day. Call your doctor if your blood pressure is 140/90 or higher [140 (top number) OR 90 (bottom number)]. Return to hospital if your blood pressure is 160/110 or higher [160 (top number)  (bottom number)]. Call your doctor if you experience symptoms such as headache, blurry vision, epigastric pain, severe swelling, or nausea/vomiting. Follow up in the office in 6 weeks for a postpartum visit.   Nothing per vagina such as tampons, intercourse, douches or tub baths for 6 weeks. Regular diet. Resume normal activity as tolerated. No heavy lifting, driving, or strenuous activity for 2 weeks. . Call your doctor with any signs and symptoms of infection such as fever, chills, nausea or vomiting.  Call your doctor if you're unable to tolerate food, increase in vaginal bleeding or have difficulty urinating.  Call your doctor if you have pain that is not relieved by your prescribed medications.  Notify your doctor with any other concerns.

## 2025-03-29 NOTE — DISCHARGE NOTE OB - FINANCIAL ASSISTANCE
[facility] provides services at a reduced cost to those who are determined to be eligible through [facility]’s financial assistance program. Information regarding [facility]’s financial assistance program can be found by going to [email or web address] or by calling [phone].

## 2025-03-29 NOTE — OB RN DELIVERY SUMMARY - NSSELHIDDEN_OBGYN_ALL_OB_FT
[NS_DeliveryAttending1_OBGYN_ALL_OB_FT:[id number]],[NS_DeliveryRN_OBGYN_ALL_OB_FT:[id number]=]

## 2025-03-29 NOTE — OB PROVIDER LABOR PROGRESS NOTE - ASSESSMENT
P1 @39+5 here for IOL for FGR    - s/p BC and PO  - s/p AROM  - IUPC/ AI  - cont to monitor toco/ tracing    Dw Dr. Haja Schwab PGY1 162.56

## 2025-03-29 NOTE — OB RN DELIVERY SUMMARY - NS_SEPSISRSKCALC_OBGYN_ALL_OB_FT
EOS calculated successfully. EOS Risk Factor: 0.5/1000 live births ([facility] national incidence); GA=39w5d; Temp=98.42; ROM=1.8; GBS='Unknown'; Antibiotics='No antibiotics or any antibiotics < 2 hrs prior to birth'

## 2025-03-29 NOTE — DISCHARGE NOTE OB - PATIENT PORTAL LINK FT
You can access the FollowMyHealth Patient Portal offered by [facility] by registering at the following website: [email or web address]. By joining [employer]’s FollowMyHealth portal, you will also be able to view your health information using other applications (apps) compatible with our system.

## 2025-03-29 NOTE — OB PROVIDER LABOR PROGRESS NOTE - NS_SUBJECTIVE/OBJECTIVE_OBGYN_ALL_OB_FT
Pt seen and examined at bedside due to rectal pressure.    Vital Signs Last 24 Hrs  T(C): 36.5 (28 Mar 2025 18:00), Max: 36.9 (28 Mar 2025 06:44)  T(F): 97.7 (28 Mar 2025 18:00), Max: 98.42 (28 Mar 2025 08:00)  HR: 80 (28 Mar 2025 23:34) (71 - 86)  BP: 141/82 (28 Mar 2025 23:34) (125/62 - 142/75)  BP(mean): --  RR: 14 (28 Mar 2025 18:00) (14 - 18)  SpO2: --    Parameters below as of 28 Mar 2025 06:44  Patient On (Oxygen Delivery Method): room air
NOTE DELAYED DUE TO CLINICAL DUTIES   Pt seen and examined at bedside. IUPC placed. Amnio infusion to start

## 2025-03-29 NOTE — OB PROVIDER LABOR PROGRESS NOTE - ASSESSMENT
IOL for FGR    -Labor: s/p cytotec, for Pitocin/AROM next VE  -Fetus: cat 2 with intermittent variable decels, mod variability, will reposition  -GBS: unk  -Pain: pt declining intervention for pain at this time    Dw Dr. Haja Mac PGY3

## 2025-03-29 NOTE — DISCHARGE NOTE OB - CARE PLAN
Principal Discharge DX:	Vaginal delivery  Assessment and plan of treatment:	routine care   1 Principal Discharge DX:	Vaginal delivery  Assessment and plan of treatment:	Follow up in the office in 6 weeks for a postpartum visit.   Nothing per vagina such as tampons, intercourse, douches or tub baths for 6 weeks. Regular diet. Resume normal activity as tolerated. No heavy lifting, driving, or strenuous activity for 2 weeks. . Call your doctor with any signs and symptoms of infection such as fever, chills, nausea or vomiting.  Call your doctor if you're unable to tolerate food, increase in vaginal bleeding or have difficulty urinating.  Call your doctor if you have pain that is not relieved by your prescribed medications.  Notify your doctor with any other concerns.  Secondary Diagnosis:	Gestational hypertension  Assessment and plan of treatment:	Follow-up in your OB office within 1 week for a blood pressure check.    blood pressure monitor from [facility] (1st floor [facility], back of [employer]).  Please take your blood pressure 3x/day. Call your doctor if your blood pressure is 140/90 or higher [140 (top number) OR 90 (bottom number)]. Return to hospital if your blood pressure is 160/110 or higher [160 (top number)  (bottom number)]. Call your doctor if you experience symptoms such as headache, blurry vision, epigastric pain, severe swelling, or nausea/vomiting.

## 2025-03-29 NOTE — OB RN DELIVERY SUMMARY - BABYS CARE PROVIDER NAME, OB PROFILE
Jennifer called and said that she was returning clinical staff call. Sent to triage line for assistance from clinical staff.     Arabella

## 2025-03-29 NOTE — OB PROVIDER DELIVERY SUMMARY - NSPROVIDERDELIVERYNOTE_OBGYN_ALL_OB_FT
, viable female , APGARS 8/9, nuchal cord x2   b/l perilabial tears repaired with chromic suture   superficial left periurethral tear hemostatic, not requiring repair   ebl 167cc

## 2025-03-30 RX ADMIN — Medication 975 MILLIGRAM(S): at 22:05

## 2025-03-30 RX ADMIN — Medication 600 MILLIGRAM(S): at 19:15

## 2025-03-30 RX ADMIN — Medication 975 MILLIGRAM(S): at 16:13

## 2025-03-30 RX ADMIN — Medication 600 MILLIGRAM(S): at 00:33

## 2025-03-30 RX ADMIN — Medication 600 MILLIGRAM(S): at 05:53

## 2025-03-30 RX ADMIN — Medication 600 MILLIGRAM(S): at 13:30

## 2025-03-30 RX ADMIN — Medication 975 MILLIGRAM(S): at 16:48

## 2025-03-30 RX ADMIN — Medication 1 TABLET(S): at 12:50

## 2025-03-30 RX ADMIN — Medication 975 MILLIGRAM(S): at 21:02

## 2025-03-30 RX ADMIN — Medication 975 MILLIGRAM(S): at 03:06

## 2025-03-30 RX ADMIN — Medication 600 MILLIGRAM(S): at 01:08

## 2025-03-30 RX ADMIN — Medication 3 MILLILITER(S): at 05:53

## 2025-03-30 RX ADMIN — Medication 975 MILLIGRAM(S): at 03:45

## 2025-03-30 RX ADMIN — Medication 600 MILLIGRAM(S): at 18:45

## 2025-03-30 RX ADMIN — Medication 600 MILLIGRAM(S): at 06:39

## 2025-03-30 RX ADMIN — Medication 600 MILLIGRAM(S): at 12:50

## 2025-03-31 VITALS
HEART RATE: 74 BPM | TEMPERATURE: 98 F | SYSTOLIC BLOOD PRESSURE: 134 MMHG | OXYGEN SATURATION: 99 % | DIASTOLIC BLOOD PRESSURE: 84 MMHG | RESPIRATION RATE: 18 BRPM

## 2025-03-31 RX ORDER — IBUPROFEN 200 MG
1 TABLET ORAL
Qty: 0 | Refills: 0 | DISCHARGE
Start: 2025-03-31

## 2025-03-31 RX ORDER — ACETAMINOPHEN 500 MG/5ML
3 LIQUID (ML) ORAL
Qty: 0 | Refills: 0 | DISCHARGE
Start: 2025-03-31

## 2025-03-31 RX ADMIN — Medication 975 MILLIGRAM(S): at 08:05

## 2025-03-31 RX ADMIN — Medication 600 MILLIGRAM(S): at 11:27

## 2025-03-31 RX ADMIN — Medication 600 MILLIGRAM(S): at 18:33

## 2025-03-31 RX ADMIN — Medication 975 MILLIGRAM(S): at 14:32

## 2025-03-31 RX ADMIN — Medication 1 TABLET(S): at 11:27

## 2025-03-31 RX ADMIN — Medication 600 MILLIGRAM(S): at 05:21

## 2025-03-31 RX ADMIN — Medication 600 MILLIGRAM(S): at 06:25

## 2025-03-31 RX ADMIN — Medication 600 MILLIGRAM(S): at 17:53

## 2025-03-31 RX ADMIN — Medication 975 MILLIGRAM(S): at 15:32

## 2025-03-31 RX ADMIN — Medication 600 MILLIGRAM(S): at 12:13

## 2025-03-31 RX ADMIN — Medication 975 MILLIGRAM(S): at 09:00

## 2025-03-31 NOTE — PROVIDER CONTACT NOTE (OTHER) - BACKGROUND
[place] from 3/29 @ 0322. [facility]. [facility] labs WNL on 3/31

## 2025-03-31 NOTE — PROGRESS NOTE ADULT - SUBJECTIVE AND OBJECTIVE BOX
OB Attending Progress Note:  PPD#1    S: 39yo PPD#1 s/p . Patient feels well. Pain is well controlled. She is tolerating a regular diet and passing flatus. She is voiding spontaneously. and ambulating without difficulty. She is breastfeeding. Denies CP/SOB. Denies lightheadedness/dizziness. Denies N/V/D.    O:  Vitals:  Vital Signs Last 24 Hrs  T(C): 36.5 (30 Mar 2025 05:38), Max: 37.1 (29 Mar 2025 14:00)  T(F): 97.7 (30 Mar 2025 05:38), Max: 98.8 (29 Mar 2025 14:00)  HR: 67 (30 Mar 2025 05:38) (67 - 73)  BP: 129/82 (30 Mar 2025 05:38) (122/68 - 129/82)  BP(mean): --  RR: 18 (30 Mar 2025 05:38) (17 - 18)  SpO2: 100% (30 Mar 2025 05:38) (99% - 100%)    Parameters below as of 29 Mar 2025 17:54  Patient On (Oxygen Delivery Method): room air        MEDICATIONS  (STANDING):  acetaminophen     Tablet .. 975 milliGRAM(s) Oral <User Schedule>  diphtheria/tetanus/pertussis (acellular) Vaccine (Adacel) 0.5 milliLiter(s) IntraMuscular once  ibuprofen  Tablet. 600 milliGRAM(s) Oral every 6 hours  ketorolac   Injectable 30 milliGRAM(s) IV Push once  oxytocin Infusion 167 milliUNIT(s)/Min (167 mL/Hr) IV Continuous <Continuous>  oxytocin Infusion 167 milliUNIT(s)/Min (167 mL/Hr) IV Continuous <Continuous>  prenatal multivitamin 1 Tablet(s) Oral daily  sodium chloride 0.9% lock flush 3 milliLiter(s) IV Push every 8 hours      Labs:  Blood type: B Positive  Rubella IgG: RPR: Negative                          10.4[L]   8.16 >-----------< 207    (  @ 12:20 )             30.9[L]                        10.5[L]   7.77 >-----------< 213    (  @ 07:20 )             30.6[L]    25 @ 12:20      138  |  105  |  6[L]  ----------------------------<  82  3.5   |  20[L]  |  0.54        Ca    8.5      28 Mar 2025 12:20    TPro  5.9[L]  /  Alb  3.2[L]  /  TBili  0.4  /  DBili  x   /  AST  14  /  ALT  6   /  AlkPhos  90  25 @ 12:20          Physical Exam:  General: NAD  CV: RR  Pulm: Breathing comfortably on RA  Abdomen: soft, non-tender, non-distended, +BS, fundus firm  Vaginal: Lochia wnl  Extremities: No erythema/edema    A/P: 39yo PPD#1 s/p , c/b gHTN.   - Pain well controlled, continue current pain regimen  - Increase ambulation, SCDs when not ambulating  - Continue regular diet  - Continue BP monitoring  - Discharge planning tomorrow      Liam Zhao MD
S: 39yo PPD#2 s/p , c/b gHTN. Patient feels well. Pain is well controlled. She is tolerating a regular diet and passing flatus. She is voiding spontaneously, and ambulating without difficulty. Denies headache, visual changes, epigastric pain, RUQ pain, CP, SOB, lightheadedness, dizziness, N/V.    O:  Vital Signs Last 24 Hrs  T(C): 36.6 (31 Mar 2025 11:00), Max: 36.6 (30 Mar 2025 18:51)  T(F): 97.9 (31 Mar 2025 11:00), Max: 97.9 (31 Mar 2025 11:00)  HR: 78 (31 Mar 2025 11:00) (70 - 78)  BP: 130/81 (31 Mar 2025 11:00) (128/77 - 130/81)  RR: 17 (31 Mar 2025 11:00) (16 - 18)  SpO2: 99% (31 Mar 2025 11:00) (99% - 100%)    Parameters below as of 31 Mar 2025 11:00  Patient On (Oxygen Delivery Method): room air      MEDICATIONS  (STANDING):  acetaminophen     Tablet .. 975 milliGRAM(s) Oral <User Schedule>  diphtheria/tetanus/pertussis (acellular) Vaccine (Adacel) 0.5 milliLiter(s) IntraMuscular once  ibuprofen  Tablet. 600 milliGRAM(s) Oral every 6 hours  prenatal multivitamin 1 Tablet(s) Oral daily  sodium chloride 0.9% lock flush 3 milliLiter(s) IV Push every 8 hours    MEDICATIONS  (PRN):  benzocaine 20%/menthol 0.5% Spray 1 Spray(s) Topical every 6 hours PRN for Perineal discomfort  dibucaine 1% Ointment 1 Application(s) Topical every 6 hours PRN Perineal discomfort  diphenhydrAMINE 25 milliGRAM(s) Oral every 6 hours PRN Pruritus  hydrocortisone 1% Cream 1 Application(s) Topical every 6 hours PRN Moderate Pain (4-6)  lanolin Ointment 1 Application(s) Topical every 6 hours PRN nipple soreness  magnesium hydroxide Suspension 30 milliLiter(s) Oral two times a day PRN Constipation  oxyCODONE    IR 5 milliGRAM(s) Oral every 3 hours PRN Moderate to Severe Pain (4-10)  oxyCODONE    IR 5 milliGRAM(s) Oral once PRN Moderate to Severe Pain (4-10)  pramoxine 1%/zinc 5% Cream 1 Application(s) Topical every 4 hours PRN Moderate Pain (4-6)  simethicone 80 milliGRAM(s) Chew every 4 hours PRN Gas  witch hazel Pads 1 Application(s) Topical every 4 hours PRN Perineal discomfort      Labs:  Blood type: B Positive  Rubella IgG: RPR: Negative                          10.4[L]   8.16 >-----------< 207    (  @ 12:20 )             30.9[L]    25 @ 12:20      138  |  105  |  6[L]  ----------------------------<  82  3.5   |  20[L]  |  0.54        Ca    8.5      28 Mar 2025 12:20    TPro  5.9[L]  /  Alb  3.2[L]  /  TBili  0.4  /  DBili  x   /  AST  14  /  ALT  6   /  AlkPhos  90  25 @ 12:20      Physical Exam:  General: NAD  Breasts: soft, nontender, non-engorged, b/l nipples intact  Abdomen: soft, non-tender, non-distended, fundus firm  Vaginal: Lochia light  Lower Extremities: No edema, erythema, or calf tenderness    A/P: 39yo PPD#2 s/p , c/b gHTN. Patient is stable and doing well post-partum.      #gHTN  -BPs well controlled overnight (120s-130s/70s-80s)  -No signs/symptoms of PEC  -HELLP labs wnl, P/C 0.1 (3/28)  -Prescription for electronic blood pressure monitor sent to [facility]. Instructions on BP monitoring, BP parameters, and signs/symptoms of PEC reviewed with patient who verbalizes understanding.  -F/u in office within 1 week for BP check    #Postpartum  -Pain well controlled, continue current pain regimen  -Increase ambulation  -Continue regular diet  -Discharge planning for today    Marnie Heck CLIFF-BC

## 2025-04-06 LAB — B-HEM STREP SPEC QL CULT: NORMAL

## 2025-06-12 NOTE — OB PROVIDER H&P - [facility]_OBGYN_ALL_OB
H & P- Obstetrics   Shyam Rizvi 27 y.o. female MRN: [id number]  Unit/Bed#: -01 Encounter: [id number]    Assessment: 27 y.o.  at 37w0d admitted for spontaneous rupture of membranes.    Plan:   37 weeks gestation of pregnancy  Assessment & Plan  Admit to OBGYN for SROM, labor  SVE /-1 on admission  Clear liquid diet   F/u T&S, CBC, RPR   IVF LR 125cc/hr   Continuous fetal monitoring and tocometry   Analgesia at maternal request   Vertex by TAUS  Plan for expectnant management       Iron deficiency anemia during pregnancy  Assessment & Plan  Hgb / Hct       Results from last 7 days   Lab Units 25  1738   HEMOGLOBIN g/dL 10.4* 10.1*   HEMATOCRIT % 32.7* 32.2*         Severe major depressive disorder (HCC)  Assessment & Plan  Home Zoloft 50mg    History of herpes genitalis  Assessment & Plan  Negative spec exam on admission, on valtrex suppression    Protein S deficiency affecting pregnancy (HCC)  Assessment & Plan  Protein S level low with PNL- 44  Recheck in postpartum    * Leakage of amniotic fluid  Assessment & Plan  SROM prior to arrival with clear fluid        Discussed case and plan w/ Dr. Barlow      Chief Complaint: leaking fluid    HPI: Shyam Rizvi is a 27 y.o.  with an RULA of 2025, by Ultrasound at 37w0d who is being admitted for spontaneous rupture of membranes. She was originally at the [facility] for vomiting, and noted a large gush of fluid at 1820. She denies having uterine contractions, has large amounts of fluid leaking, and reports no VB. She states she has felt good FM.    Problem List[1]    Baby complications/comments: none    Review of Systems   Constitutional:  Negative for chills and fever.   HENT:  Negative for ear pain and sore throat.    Eyes:  Negative for pain and visual disturbance.   Respiratory:  Negative for cough and shortness of breath.    Cardiovascular:  Negative for chest pain and palpitations.   Gastrointestinal:  Negative for  abdominal pain and vomiting.   Genitourinary:  Negative for dysuria, hematuria and vaginal bleeding.   Musculoskeletal:  Negative for arthralgias and back pain.   Skin:  Negative for color change and rash.   Neurological:  Negative for seizures and syncope.   Psychiatric/Behavioral:  The patient is not nervous/anxious.    All other systems reviewed and are negative.      OB Hx:  OB History    Para Term  AB Living   3 1 0 1 1 1   SAB IAB Ectopic Multiple Live Births   1 0 0 0 1      # Outcome Date GA Lbr Mitchell/2nd Weight Sex Type Anes PTL Lv   3 Current            2 SAB 24 6w0d    SAB      1  22 35w5d / 00:15 2495 g (5 lb 8 oz) M Vag-Spont None Y NIRU      Complications:  premature rupture of membranes (PPROM) with onset of labor after 24 hours of rupture in first trimester, antepartum       Past Medical Hx:  Past Medical History[2]    Past Surgical hx:  Past Surgical History[3]    Social Hx:  Alcohol use: denies  Tobacco use: denies  Other substance use: denies    Allergies[4]      Medications Prior to Admission:     acetaminophen (TYLENOL) 650 mg CR tablet    aspirin (ECOTRIN LOW STRENGTH) 81 mg EC tablet    ondansetron (ZOFRAN-ODT) 4 mg disintegrating tablet    Prenatal Vit-Fe Fumarate-FA (PRENATAL PO)    sertraline (Zoloft) 50 mg tablet    valACYclovir (VALTREX) 500 mg tablet    valACYclovir (VALTREX) 1,000 mg tablet    Objective:  Temp:  [97.5 °F (36.4 °C)-98.9 °F (37.2 °C)] 97.8 °F (36.6 °C)  HR:  [] 95  BP: (103-128)/(61-78) 109/67  Resp:  [16-20] 16  SpO2:  [98 %-100 %] 99 %  O2 Device: None (Room air)  Body mass index is 29.79 kg/m².     Physical Exam:  Physical Exam  Constitutional:       Appearance: Normal appearance.   HENT:      Head: Normocephalic and atraumatic.      Mouth/Throat:      Pharynx: Oropharynx is clear.     Eyes:      General: No scleral icterus.     Extraocular Movements: Extraocular movements intact.       Cardiovascular:      Rate and Rhythm:  Normal rate and regular rhythm.      Pulses: Normal pulses.   Pulmonary:      Effort: Pulmonary effort is normal. No respiratory distress.   Abdominal:      Tenderness: There is no abdominal tenderness.      Comments: Gravid     Musculoskeletal:      Right lower leg: No edema.      Left lower leg: No edema.     Neurological:      General: No focal deficit present.      Mental Status: She is alert and oriented to person, place, and time.     Skin:     General: Skin is warm and dry.     Psychiatric:         Mood and Affect: Mood normal.         Behavior: Behavior normal.         Thought Content: Thought content normal.         Judgment: Judgment normal.   Vitals and nursing note reviewed. Exam conducted with a chaperone present.            FHT:  Baseline Rate (FHR): 135 bpm  Variability: Moderate  Accelerations: 15 x 15 or greater  Decelerations: None    TOCO:   Contraction Frequency (minutes): irregular/irritability  Contraction Duration (seconds):   Contraction Intensity: Mild/Moderate    Lab Results   Component Value Date    WBC 8.60 2025    HGB 10.4 (L) 2025    HCT 32.7 (L) 2025     2025     Lab Results   Component Value Date    K 3.5 2025     2025    CO2 20 (L) 2025    BUN 5 2025    CREATININE 0.49 (L) 2025    AST 34 2025    ALT 27 2025       Prenatal Labs: Reviewed      Blood type: O positive  Antibody: negative  GBS: unknown  HIV: Non-reactive  Rubella: Immune  Syphilis IgM/IgG: Non-reactive  HBsAg: Non-reactive  HCAb: Non-reactive  Chlamydia: Negative  Gonorrhea: Negative  Diabetes 1 hour screen: normal   3 hour glucose: not indicated  Platelets: 257k on 25  Hgb: 9.7 on 25    >2 Midnights  INPATIENT     Signature/Title: Kirill Modi MD  Date: 2025  Time: 9:55 PM         [1]   Patient Active Problem List  Diagnosis    Depression    Choledocholithiasis    History of  delivery, currently pregnant     COVID-19 affecting pregnancy in first trimester    Sickle cell trait in mother affecting pregnancy (HCC)    Family history of congenital anomaly    Nausea and vomiting during pregnancy prior to 22 weeks gestation    Protein S deficiency affecting pregnancy (HCC)    History of  premature rupture of membranes (PROM) in previous pregnancy, currently pregnant in second trimester    34 weeks gestation of pregnancy    History of herpes genitalis    Severe major depressive disorder (HCC)    Umbilical hernia without obstruction and without gangrene    Iron deficiency anemia during pregnancy    Leakage of amniotic fluid    37 weeks gestation of pregnancy   [2]   Past Medical History:  Diagnosis Date    Anemia     Chlamydia 2019    COVID-19 2022    Depression     Gonorrhea     2019   tx'd    Migraine     Sickle cell trait (HCC)     Varicella     childhood chickenpox   [3]   Past Surgical History:  Procedure Laterality Date    NO PAST SURGERIES     [4] No Known Allergies     Unknown at this time

## 2025-07-01 ENCOUNTER — APPOINTMENT (OUTPATIENT)
Dept: OBGYN | Facility: CLINIC | Age: 40
End: 2025-07-01

## 2025-07-01 VITALS — DIASTOLIC BLOOD PRESSURE: 86 MMHG | WEIGHT: 231 LBS | BODY MASS INDEX: 36.18 KG/M2 | SYSTOLIC BLOOD PRESSURE: 123 MMHG

## 2025-07-01 PROBLEM — Z3A.14 14 WEEKS GESTATION OF PREGNANCY: Status: RESOLVED | Noted: 2024-09-30 | Resolved: 2025-07-01

## 2025-07-01 PROBLEM — O09.90 SUPERVISION OF HIGH-RISK PREGNANCY: Status: RESOLVED | Noted: 2024-09-30 | Resolved: 2025-07-01

## 2025-07-01 PROBLEM — Z01.419 WOMEN'S ANNUAL ROUTINE GYNECOLOGICAL EXAMINATION: Status: ACTIVE | Noted: 2025-07-01

## 2025-07-01 PROBLEM — Z78.9 NON-SMOKER: Status: ACTIVE | Noted: 2025-07-01

## 2025-07-01 PROBLEM — Z11.3 SCREEN FOR SEXUALLY TRANSMITTED DISEASES: Status: ACTIVE | Noted: 2025-07-01

## 2025-07-01 PROBLEM — O99.019 ANEMIA IN PREGNANCY: Status: RESOLVED | Noted: 2025-01-13 | Resolved: 2025-07-01

## 2025-07-01 PROBLEM — Z78.9 SOCIAL ALCOHOL USE: Status: ACTIVE | Noted: 2025-07-01

## 2025-07-01 PROBLEM — Z83.3 FAMILY HISTORY OF DIABETES MELLITUS: Status: ACTIVE | Noted: 2025-07-01

## 2025-07-01 PROBLEM — O09.299: Status: RESOLVED | Noted: 2024-09-30 | Resolved: 2025-07-01

## 2025-07-01 PROBLEM — Z82.49 FAMILY HX OF HYPERTENSION: Status: ACTIVE | Noted: 2025-07-01

## 2025-07-01 PROCEDURE — 99395 PREV VISIT EST AGE 18-39: CPT | Mod: 25

## 2025-07-01 PROCEDURE — 99459 PELVIC EXAMINATION: CPT

## 2025-07-02 LAB
APPEARANCE: CLEAR
BACTERIA: NEGATIVE /HPF
BASOPHILS # BLD AUTO: 0.02 K/UL
BASOPHILS NFR BLD AUTO: 0.4 %
BILIRUBIN URINE: NEGATIVE
BLOOD URINE: NEGATIVE
CAST: 0 /LPF
COLOR: YELLOW
EOSINOPHIL # BLD AUTO: 0.23 K/UL
EOSINOPHIL NFR BLD AUTO: 4.7 %
EPITHELIAL CELLS: 1 /HPF
GLUCOSE QUALITATIVE U: NEGATIVE MG/DL
HBV SURFACE AG SER QL: NONREACTIVE
HCT VFR BLD CALC: 36.8 %
HGB BLD-MCNC: 11.8 G/DL
HIV1+2 AB SPEC QL IA.RAPID: NONREACTIVE
IMM GRANULOCYTES NFR BLD AUTO: 0.2 %
KETONES URINE: NEGATIVE MG/DL
LEUKOCYTE ESTERASE URINE: NEGATIVE
LYMPHOCYTES # BLD AUTO: 2.83 K/UL
LYMPHOCYTES NFR BLD AUTO: 57.4 %
MAN DIFF?: NORMAL
MCHC RBC-ENTMCNC: 28.6 PG
MCHC RBC-ENTMCNC: 32.1 G/DL
MCV RBC AUTO: 89.3 FL
MICROSCOPIC-UA: NORMAL
MONOCYTES # BLD AUTO: 0.33 K/UL
MONOCYTES NFR BLD AUTO: 6.7 %
NEUTROPHILS # BLD AUTO: 1.51 K/UL
NEUTROPHILS NFR BLD AUTO: 30.6 %
NITRITE URINE: NEGATIVE
PH URINE: 6.5
PLATELET # BLD AUTO: 258 K/UL
PROTEIN URINE: NEGATIVE MG/DL
RBC # BLD: 4.12 M/UL
RBC # FLD: 14.6 %
RED BLOOD CELLS URINE: 1 /HPF
SPECIFIC GRAVITY URINE: 1.03
UROBILINOGEN URINE: 1 MG/DL
WBC # FLD AUTO: 4.93 K/UL
WHITE BLOOD CELLS URINE: 0 /HPF

## 2025-07-07 LAB — BACTERIA UR CULT: NORMAL

## 2025-07-10 LAB
C TRACH RRNA SPEC QL NAA+PROBE: NOT DETECTED
CYTOLOGY CVX/VAG DOC THIN PREP: NORMAL
HPV 16 E6+E7 MRNA CVX QL NAA+PROBE: NOT DETECTED
HPV HIGH+LOW RISK DNA PNL CVX: NOT DETECTED
HPV HIGH+LOW RISK DNA PNL CVX: NOT DETECTED
HPV18+45 E6+E7 MRNA CVX QL NAA+PROBE: NOT DETECTED
HSV 1+2 IGG SER IA-IMP: NEGATIVE
HSV 1+2 IGG SER IA-IMP: POSITIVE
HSV1 IGG SER QL: 0.24 INDEX
HSV2 IGG SER QL: 18.9 INDEX
N GONORRHOEA RRNA SPEC QL NAA+PROBE: NOT DETECTED
SOURCE AMPLIFICATION: NORMAL
T PALLIDUM AB SER QL IA: NEGATIVE